# Patient Record
Sex: FEMALE | Race: WHITE | NOT HISPANIC OR LATINO
[De-identification: names, ages, dates, MRNs, and addresses within clinical notes are randomized per-mention and may not be internally consistent; named-entity substitution may affect disease eponyms.]

---

## 2017-08-15 ENCOUNTER — APPOINTMENT (OUTPATIENT)
Dept: ORTHOPEDIC SURGERY | Facility: CLINIC | Age: 42
End: 2017-08-15
Payer: COMMERCIAL

## 2017-08-15 DIAGNOSIS — M76.72 PERONEAL TENDINITIS, LEFT LEG: ICD-10-CM

## 2017-08-15 DIAGNOSIS — E03.9 HYPOTHYROIDISM, UNSPECIFIED: ICD-10-CM

## 2017-08-15 DIAGNOSIS — S93.492A SPRAIN OF OTHER LIGAMENT OF LEFT ANKLE, INITIAL ENCOUNTER: ICD-10-CM

## 2017-08-15 DIAGNOSIS — M25.572 PAIN IN LEFT ANKLE AND JOINTS OF LEFT FOOT: ICD-10-CM

## 2017-08-15 DIAGNOSIS — G57.32 LESION OF LATERAL POPLITEAL NERVE, LEFT LOWER LIMB: ICD-10-CM

## 2017-08-15 PROCEDURE — 73610 X-RAY EXAM OF ANKLE: CPT | Mod: RT

## 2017-08-15 PROCEDURE — 99203 OFFICE O/P NEW LOW 30 MIN: CPT

## 2017-08-15 RX ORDER — LEVOTHYROXINE SODIUM 50 UG/1
50 TABLET ORAL
Refills: 0 | Status: ACTIVE | COMMUNITY

## 2017-09-08 ENCOUNTER — OUTPATIENT (OUTPATIENT)
Dept: OUTPATIENT SERVICES | Facility: HOSPITAL | Age: 42
LOS: 1 days | End: 2017-09-08
Payer: COMMERCIAL

## 2017-09-08 ENCOUNTER — APPOINTMENT (OUTPATIENT)
Dept: MAMMOGRAPHY | Facility: CLINIC | Age: 42
End: 2017-09-08
Payer: COMMERCIAL

## 2017-09-08 DIAGNOSIS — Z00.8 ENCOUNTER FOR OTHER GENERAL EXAMINATION: ICD-10-CM

## 2017-09-08 DIAGNOSIS — Z00.00 ENCOUNTER FOR GENERAL ADULT MEDICAL EXAMINATION WITHOUT ABNORMAL FINDINGS: ICD-10-CM

## 2017-09-08 PROCEDURE — 77063 BREAST TOMOSYNTHESIS BI: CPT

## 2017-09-08 PROCEDURE — G0202: CPT | Mod: 26

## 2017-09-08 PROCEDURE — 77063 BREAST TOMOSYNTHESIS BI: CPT | Mod: 26

## 2017-09-08 PROCEDURE — 77067 SCR MAMMO BI INCL CAD: CPT

## 2018-05-16 ENCOUNTER — RECORD ABSTRACTING (OUTPATIENT)
Age: 43
End: 2018-05-16

## 2018-05-16 DIAGNOSIS — Z80.0 FAMILY HISTORY OF MALIGNANT NEOPLASM OF DIGESTIVE ORGANS: ICD-10-CM

## 2018-05-16 DIAGNOSIS — Z82.49 FAMILY HISTORY OF ISCHEMIC HEART DISEASE AND OTHER DISEASES OF THE CIRCULATORY SYSTEM: ICD-10-CM

## 2018-05-16 DIAGNOSIS — M77.8 OTHER ENTHESOPATHIES, NOT ELSEWHERE CLASSIFIED: ICD-10-CM

## 2018-05-16 DIAGNOSIS — Z83.49 FAMILY HISTORY OF OTHER ENDOCRINE, NUTRITIONAL AND METABOLIC DISEASES: ICD-10-CM

## 2018-05-16 DIAGNOSIS — Z78.9 OTHER SPECIFIED HEALTH STATUS: ICD-10-CM

## 2018-05-16 RX ORDER — PRENATAL SUPPLEMENT WITH DHA 1100; 30; 1.6; 1.8; 15; 2.5; .012; 1; .15; 20; 25; 2; 1000; 200; 20; 29 [IU]/1; MG/1; MG/1; MG/1; MG/1; MG/1; MG/1; MG/1; MG/1; MG/1; MG/1; MG/1; [IU]/1; MG/1; [IU]/1; MG/1
29-1-200 CAPSULE, GELATIN COATED ORAL
Refills: 0 | Status: ACTIVE | COMMUNITY

## 2018-06-01 ENCOUNTER — APPOINTMENT (OUTPATIENT)
Dept: INTERNAL MEDICINE | Facility: CLINIC | Age: 43
End: 2018-06-01

## 2018-07-18 ENCOUNTER — RECORD ABSTRACTING (OUTPATIENT)
Age: 43
End: 2018-07-18

## 2018-07-26 ENCOUNTER — RECORD ABSTRACTING (OUTPATIENT)
Age: 43
End: 2018-07-26

## 2018-07-26 DIAGNOSIS — N32.81 OVERACTIVE BLADDER: ICD-10-CM

## 2018-08-10 ENCOUNTER — APPOINTMENT (OUTPATIENT)
Dept: INTERNAL MEDICINE | Facility: CLINIC | Age: 43
End: 2018-08-10
Payer: COMMERCIAL

## 2018-08-10 ENCOUNTER — NON-APPOINTMENT (OUTPATIENT)
Age: 43
End: 2018-08-10

## 2018-08-10 VITALS
DIASTOLIC BLOOD PRESSURE: 70 MMHG | TEMPERATURE: 98.3 F | SYSTOLIC BLOOD PRESSURE: 120 MMHG | BODY MASS INDEX: 32.78 KG/M2 | WEIGHT: 185 LBS | HEIGHT: 63 IN

## 2018-08-10 VITALS — RESPIRATION RATE: 16 BRPM | HEART RATE: 72 BPM

## 2018-08-10 DIAGNOSIS — Z00.00 ENCOUNTER FOR GENERAL ADULT MEDICAL EXAMINATION W/OUT ABNORMAL FINDINGS: ICD-10-CM

## 2018-08-10 PROCEDURE — 93000 ELECTROCARDIOGRAM COMPLETE: CPT

## 2018-08-10 PROCEDURE — 36415 COLL VENOUS BLD VENIPUNCTURE: CPT

## 2018-08-10 PROCEDURE — 99396 PREV VISIT EST AGE 40-64: CPT | Mod: 25

## 2018-08-10 RX ORDER — ASPIRIN ENTERIC COATED TABLETS 81 MG 81 MG/1
81 TABLET, DELAYED RELEASE ORAL
Refills: 0 | Status: ACTIVE | COMMUNITY

## 2018-08-10 NOTE — HISTORY OF PRESENT ILLNESS
[FreeTextEntry1] : physical [de-identified] : Five months pregnant.  Due Dec 28.  No complications. On ASA.  Had pre-eclampsia last pregnancy.\par Taking 100mcg synthroid twice weekly.\par Walks daily in the city from [employer].\par Derm is UTD.

## 2018-08-10 NOTE — HISTORY OF PRESENT ILLNESS
[FreeTextEntry1] : physical [de-identified] : Five months pregnant.  Due Dec 28.  No complications. On ASA.  Had pre-eclampsia last pregnancy.\par Taking 100mcg synthroid twice weekly.\par Walks daily in the city from "[employer].\par Derm is UTD.

## 2018-08-10 NOTE — PHYSICAL EXAM
[No Acute Distress] : no acute distress [Normal Sclera/Conjunctiva] : normal sclera/conjunctiva [Normal Outer Ear/Nose] : the outer ears and nose were normal in appearance [Thyroid Normal, No Nodules] : the thyroid was normal and there were no nodules present [Clear to Auscultation] : lungs were clear to auscultation bilaterally [Normal Rate] : normal rate  [Regular Rhythm] : with a regular rhythm [No Edema] : there was no peripheral edema [No Axillary Lymphadenopathy] : no axillary lymphadenopathy [No Masses] : no abdominal mass palpated [Normal Posterior Cervical Nodes] : no posterior cervical lymphadenopathy [Normal Anterior Cervical Nodes] : no anterior cervical lymphadenopathy [Grossly Normal Strength/Tone] : grossly normal strength/tone [No Rash] : no rash [Coordination Grossly Intact] : coordination grossly intact [Normal Insight/Judgement] : insight and judgment were intact [Kyphosis] : no kyphosis

## 2018-08-10 NOTE — HEALTH RISK ASSESSMENT
[0] : 2) Feeling down, depressed, or hopeless: Not at all (0) [Patient reported PAP Smear was normal] : Patient reported PAP Smear was normal [HIV test declined] : HIV test declined [[id number]] : 0 [PapSmearDate] : 01/18

## 2018-08-13 ENCOUNTER — MESSAGE (OUTPATIENT)
Age: 43
End: 2018-08-13

## 2018-08-13 ENCOUNTER — TRANSCRIPTION ENCOUNTER (OUTPATIENT)
Age: 43
End: 2018-08-13

## 2018-08-13 DIAGNOSIS — E53.8 DEFICIENCY OF OTHER SPECIFIED B GROUP VITAMINS: ICD-10-CM

## 2018-08-13 LAB
25(OH)D3 SERPL-MCNC: 38.4 NG/ML
ALBUMIN SERPL ELPH-MCNC: 3.8 G/DL
ALP BLD-CCNC: 52 U/L
ALT SERPL-CCNC: 16 U/L
ANION GAP SERPL CALC-SCNC: 16 MMOL/L
APPEARANCE: CLEAR
AST SERPL-CCNC: 17 U/L
BACTERIA: NEGATIVE
BASOPHILS # BLD AUTO: 0.01 K/UL
BASOPHILS NFR BLD AUTO: 0.1 %
BILIRUB SERPL-MCNC: 0.4 MG/DL
BILIRUBIN URINE: NEGATIVE
BLOOD URINE: NEGATIVE
BUN SERPL-MCNC: 8 MG/DL
CALCIUM SERPL-MCNC: 9.2 MG/DL
CHLORIDE SERPL-SCNC: 99 MMOL/L
CHOLEST SERPL-MCNC: 224 MG/DL
CHOLEST/HDLC SERPL: 2.3 RATIO
CO2 SERPL-SCNC: 21 MMOL/L
COLOR: YELLOW
CREAT SERPL-MCNC: 0.59 MG/DL
EOSINOPHIL # BLD AUTO: 0.04 K/UL
EOSINOPHIL NFR BLD AUTO: 0.5 %
GLUCOSE QUALITATIVE U: NEGATIVE MG/DL
GLUCOSE SERPL-MCNC: 55 MG/DL
HBA1C MFR BLD HPLC: 4.8 %
HCT VFR BLD CALC: 36.8 %
HDLC SERPL-MCNC: 99 MG/DL
HGB BLD-MCNC: 12.2 G/DL
HYALINE CASTS: 2 /LPF
IMM GRANULOCYTES NFR BLD AUTO: 0.4 %
KETONES URINE: NEGATIVE
LDLC SERPL CALC-MCNC: 104 MG/DL
LEUKOCYTE ESTERASE URINE: NEGATIVE
LYMPHOCYTES # BLD AUTO: 1.24 K/UL
LYMPHOCYTES NFR BLD AUTO: 16.1 %
MAN DIFF?: NORMAL
MCHC RBC-ENTMCNC: 31.2 PG
MCHC RBC-ENTMCNC: 33.2 GM/DL
MCV RBC AUTO: 94.1 FL
MICROSCOPIC-UA: NORMAL
MONOCYTES # BLD AUTO: 0.44 K/UL
MONOCYTES NFR BLD AUTO: 5.7 %
NEUTROPHILS # BLD AUTO: 5.93 K/UL
NEUTROPHILS NFR BLD AUTO: 77.2 %
NITRITE URINE: NEGATIVE
PH URINE: 7.5
PLATELET # BLD AUTO: 206 K/UL
POTASSIUM SERPL-SCNC: 3.9 MMOL/L
PROT SERPL-MCNC: 7 G/DL
PROTEIN URINE: NEGATIVE MG/DL
RBC # BLD: 3.91 M/UL
RBC # FLD: 13.8 %
RED BLOOD CELLS URINE: 1 /HPF
SODIUM SERPL-SCNC: 136 MMOL/L
SPECIFIC GRAVITY URINE: 1.01
SQUAMOUS EPITHELIAL CELLS: 1 /HPF
TRIGL SERPL-MCNC: 106 MG/DL
TSH SERPL-ACNC: 1.94 UIU/ML
UROBILINOGEN URINE: NEGATIVE MG/DL
VIT B12 SERPL-MCNC: 276 PG/ML
WBC # FLD AUTO: 7.69 K/UL
WHITE BLOOD CELLS URINE: 0 /HPF

## 2018-08-14 ENCOUNTER — RESULT REVIEW (OUTPATIENT)
Age: 43
End: 2018-08-14

## 2018-08-15 ENCOUNTER — TRANSCRIPTION ENCOUNTER (OUTPATIENT)
Age: 43
End: 2018-08-15

## 2018-10-17 ENCOUNTER — TRANSCRIPTION ENCOUNTER (OUTPATIENT)
Age: 43
End: 2018-10-17

## 2018-10-18 ENCOUNTER — TRANSCRIPTION ENCOUNTER (OUTPATIENT)
Age: 43
End: 2018-10-18

## 2020-02-07 ENCOUNTER — APPOINTMENT (OUTPATIENT)
Dept: MAMMOGRAPHY | Facility: CLINIC | Age: 45
End: 2020-02-07
Payer: COMMERCIAL

## 2020-02-07 ENCOUNTER — OUTPATIENT (OUTPATIENT)
Dept: OUTPATIENT SERVICES | Facility: HOSPITAL | Age: 45
LOS: 1 days | End: 2020-02-07
Payer: COMMERCIAL

## 2020-02-07 DIAGNOSIS — Z00.8 ENCOUNTER FOR OTHER GENERAL EXAMINATION: ICD-10-CM

## 2020-02-07 PROCEDURE — 77063 BREAST TOMOSYNTHESIS BI: CPT

## 2020-02-07 PROCEDURE — 77063 BREAST TOMOSYNTHESIS BI: CPT | Mod: 26

## 2020-02-07 PROCEDURE — 77067 SCR MAMMO BI INCL CAD: CPT | Mod: 26

## 2020-02-07 PROCEDURE — 77067 SCR MAMMO BI INCL CAD: CPT

## 2021-04-23 ENCOUNTER — OUTPATIENT (OUTPATIENT)
Dept: OUTPATIENT SERVICES | Facility: HOSPITAL | Age: 46
LOS: 1 days | End: 2021-04-23
Payer: COMMERCIAL

## 2021-04-23 ENCOUNTER — APPOINTMENT (OUTPATIENT)
Dept: MAMMOGRAPHY | Facility: CLINIC | Age: 46
End: 2021-04-23
Payer: COMMERCIAL

## 2021-04-23 DIAGNOSIS — Z00.8 ENCOUNTER FOR OTHER GENERAL EXAMINATION: ICD-10-CM

## 2021-04-23 PROCEDURE — 77067 SCR MAMMO BI INCL CAD: CPT | Mod: 26

## 2021-04-23 PROCEDURE — 77067 SCR MAMMO BI INCL CAD: CPT

## 2021-04-23 PROCEDURE — 77063 BREAST TOMOSYNTHESIS BI: CPT

## 2021-04-23 PROCEDURE — 77063 BREAST TOMOSYNTHESIS BI: CPT | Mod: 26

## 2021-05-08 ENCOUNTER — TRANSCRIPTION ENCOUNTER (OUTPATIENT)
Age: 46
End: 2021-05-08

## 2021-07-22 ENCOUNTER — RESULT REVIEW (OUTPATIENT)
Age: 46
End: 2021-07-22

## 2022-05-26 ENCOUNTER — APPOINTMENT (OUTPATIENT)
Dept: MAMMOGRAPHY | Facility: CLINIC | Age: 47
End: 2022-05-26
Payer: COMMERCIAL

## 2022-05-26 PROCEDURE — 77063 BREAST TOMOSYNTHESIS BI: CPT

## 2022-05-26 PROCEDURE — 77067 SCR MAMMO BI INCL CAD: CPT

## 2022-08-02 ENCOUNTER — RESULT REVIEW (OUTPATIENT)
Age: 47
End: 2022-08-02

## 2022-11-15 NOTE — HEALTH RISK ASSESSMENT
Problem: At Risk for Falls  Goal: # Takes action to control fall-related risks  Outcome: Outcome Met, Continue evaluating goal progress toward completion     Problem: Pain  Goal: Acceptable pain/comfort level is achieved/maintained at rest (based on Pain Behaviors Scale)  Description: This goal is used for patients who are not able to self-report pain and are assessed for pain using the Pain Behaviors Scale  Outcome: Outcome Met, Continue evaluating goal progress toward completion     Problem: Pressure Injury, Risk for  Goal: # Skin remains intact  Outcome: Outcome Met, Continue evaluating goal progress toward completion      [0] : 2) Feeling down, depressed, or hopeless: Not at all (0) [Patient reported PAP Smear was normal] : Patient reported PAP Smear was normal [HIV test declined] : HIV test declined [[id number]] : 0 [PapSmearDate] : 01/18

## 2023-03-16 ENCOUNTER — NON-APPOINTMENT (OUTPATIENT)
Age: 48
End: 2023-03-16

## 2023-06-01 ENCOUNTER — APPOINTMENT (OUTPATIENT)
Dept: MAMMOGRAPHY | Facility: CLINIC | Age: 48
End: 2023-06-01
Payer: COMMERCIAL

## 2023-06-01 PROCEDURE — 77067 SCR MAMMO BI INCL CAD: CPT

## 2023-06-01 PROCEDURE — 77063 BREAST TOMOSYNTHESIS BI: CPT

## 2024-06-14 ENCOUNTER — APPOINTMENT (OUTPATIENT)
Dept: MAMMOGRAPHY | Facility: CLINIC | Age: 49
End: 2024-06-14

## 2024-06-14 PROCEDURE — 77067 SCR MAMMO BI INCL CAD: CPT

## 2024-06-14 PROCEDURE — 77063 BREAST TOMOSYNTHESIS BI: CPT

## 2025-06-17 ENCOUNTER — APPOINTMENT (OUTPATIENT)
Dept: MAMMOGRAPHY | Facility: CLINIC | Age: 50
End: 2025-06-17
Payer: COMMERCIAL

## 2025-06-17 PROCEDURE — 77063 BREAST TOMOSYNTHESIS BI: CPT

## 2025-06-17 PROCEDURE — 77067 SCR MAMMO BI INCL CAD: CPT
